# Patient Record
Sex: FEMALE | Race: WHITE | NOT HISPANIC OR LATINO | Employment: UNEMPLOYED | URBAN - METROPOLITAN AREA
[De-identification: names, ages, dates, MRNs, and addresses within clinical notes are randomized per-mention and may not be internally consistent; named-entity substitution may affect disease eponyms.]

---

## 2017-04-13 ENCOUNTER — LAB REQUISITION (OUTPATIENT)
Dept: LAB | Facility: HOSPITAL | Age: 62
End: 2017-04-13

## 2017-04-13 ENCOUNTER — APPOINTMENT (OUTPATIENT)
Dept: LAB | Facility: HOSPITAL | Age: 62
End: 2017-04-13

## 2017-04-13 DIAGNOSIS — H66.91 OTITIS MEDIA OF RIGHT EAR: ICD-10-CM

## 2017-04-13 PROCEDURE — 87205 SMEAR GRAM STAIN: CPT | Performed by: OTOLARYNGOLOGY

## 2017-04-13 PROCEDURE — 87186 SC STD MICRODIL/AGAR DIL: CPT | Performed by: OTOLARYNGOLOGY

## 2017-04-13 PROCEDURE — 87147 CULTURE TYPE IMMUNOLOGIC: CPT | Performed by: OTOLARYNGOLOGY

## 2017-04-13 PROCEDURE — 87070 CULTURE OTHR SPECIMN AEROBIC: CPT | Performed by: OTOLARYNGOLOGY

## 2017-04-13 PROCEDURE — 87077 CULTURE AEROBIC IDENTIFY: CPT | Performed by: OTOLARYNGOLOGY

## 2017-04-17 LAB
BACTERIA SPEC AEROBE CULT: ABNORMAL
BACTERIA SPEC AEROBE CULT: ABNORMAL
GRAM STN SPEC: ABNORMAL

## 2017-08-02 PROCEDURE — 87205 SMEAR GRAM STAIN: CPT | Performed by: OTOLARYNGOLOGY

## 2017-08-02 PROCEDURE — 87070 CULTURE OTHR SPECIMN AEROBIC: CPT | Performed by: OTOLARYNGOLOGY

## 2017-08-02 PROCEDURE — 87077 CULTURE AEROBIC IDENTIFY: CPT | Performed by: OTOLARYNGOLOGY

## 2017-08-02 PROCEDURE — 87186 SC STD MICRODIL/AGAR DIL: CPT | Performed by: OTOLARYNGOLOGY

## 2017-08-02 PROCEDURE — 87147 CULTURE TYPE IMMUNOLOGIC: CPT | Performed by: OTOLARYNGOLOGY

## 2017-08-03 ENCOUNTER — LAB REQUISITION (OUTPATIENT)
Dept: LAB | Facility: HOSPITAL | Age: 62
End: 2017-08-03

## 2017-08-03 DIAGNOSIS — H66.91 OTITIS MEDIA OF RIGHT EAR: ICD-10-CM

## 2017-08-03 DIAGNOSIS — H95.121 GRANULATION OF POSTMASTOIDECTOMY CAVITY OF RIGHT SIDE: ICD-10-CM

## 2017-08-06 LAB
BACTERIA SPEC AEROBE CULT: ABNORMAL
BACTERIA SPEC AEROBE CULT: ABNORMAL
GRAM STN SPEC: ABNORMAL
GRAM STN SPEC: ABNORMAL

## 2017-10-18 PROCEDURE — 87147 CULTURE TYPE IMMUNOLOGIC: CPT | Performed by: OTOLARYNGOLOGY

## 2017-10-18 PROCEDURE — 87070 CULTURE OTHR SPECIMN AEROBIC: CPT | Performed by: OTOLARYNGOLOGY

## 2017-10-18 PROCEDURE — 87205 SMEAR GRAM STAIN: CPT | Performed by: OTOLARYNGOLOGY

## 2017-10-18 PROCEDURE — 87077 CULTURE AEROBIC IDENTIFY: CPT | Performed by: OTOLARYNGOLOGY

## 2017-10-18 PROCEDURE — 87186 SC STD MICRODIL/AGAR DIL: CPT | Performed by: OTOLARYNGOLOGY

## 2017-10-19 ENCOUNTER — LAB REQUISITION (OUTPATIENT)
Dept: LAB | Facility: HOSPITAL | Age: 62
End: 2017-10-19

## 2017-10-19 DIAGNOSIS — H66.91 OTITIS MEDIA OF RIGHT EAR: ICD-10-CM

## 2017-10-22 LAB
BACTERIA SPEC AEROBE CULT: ABNORMAL
GRAM STN SPEC: ABNORMAL

## 2017-12-07 ENCOUNTER — LAB (OUTPATIENT)
Dept: LAB | Facility: HOSPITAL | Age: 62
End: 2017-12-07

## 2017-12-07 ENCOUNTER — TRANSCRIBE ORDERS (OUTPATIENT)
Dept: LAB | Facility: HOSPITAL | Age: 62
End: 2017-12-07

## 2017-12-07 DIAGNOSIS — H71.91 CHOLESTEATOMA OF RIGHT EAR: ICD-10-CM

## 2017-12-07 DIAGNOSIS — H65.499 CHRONIC OTITIS MEDIA WITH EFFUSION, UNSPECIFIED LATERALITY: Primary | ICD-10-CM

## 2017-12-07 PROCEDURE — 87077 CULTURE AEROBIC IDENTIFY: CPT

## 2017-12-07 PROCEDURE — 87070 CULTURE OTHR SPECIMN AEROBIC: CPT

## 2017-12-07 PROCEDURE — 87186 SC STD MICRODIL/AGAR DIL: CPT

## 2017-12-07 PROCEDURE — 87147 CULTURE TYPE IMMUNOLOGIC: CPT

## 2017-12-11 LAB
BACTERIA SPEC AEROBE CULT: ABNORMAL

## 2019-10-09 ENCOUNTER — OFFICE VISIT (OUTPATIENT)
Dept: ENDOCRINOLOGY | Facility: CLINIC | Age: 64
End: 2019-10-09

## 2019-10-09 VITALS
DIASTOLIC BLOOD PRESSURE: 72 MMHG | HEART RATE: 78 BPM | WEIGHT: 188 LBS | SYSTOLIC BLOOD PRESSURE: 120 MMHG | OXYGEN SATURATION: 98 %

## 2019-10-09 DIAGNOSIS — E61.1 IRON DEFICIENCY: ICD-10-CM

## 2019-10-09 DIAGNOSIS — R61 EXCESSIVE SWEATING: ICD-10-CM

## 2019-10-09 DIAGNOSIS — E11.42 CONTROLLED TYPE 2 DIABETES MELLITUS WITH DIABETIC POLYNEUROPATHY, WITHOUT LONG-TERM CURRENT USE OF INSULIN (HCC): Primary | ICD-10-CM

## 2019-10-09 DIAGNOSIS — R53.82 CHRONIC FATIGUE: ICD-10-CM

## 2019-10-09 DIAGNOSIS — E78.2 MIXED HYPERLIPIDEMIA: ICD-10-CM

## 2019-10-09 LAB
ALBUMIN UR-MCNC: 3.2 MG/DL
CORTIS SERPL-MCNC: 12.71 MCG/DL
CREAT UR-MCNC: 85.2 MG/DL
FERRITIN SERPL-MCNC: 65.9 NG/ML (ref 13–150)
GLUCOSE BLDC GLUCOMTR-MCNC: 156 MG/DL (ref 70–130)
HBA1C MFR BLD: 6.6 %
IRON 24H UR-MRATE: 124 MCG/DL (ref 37–145)
MICROALBUMIN/CREAT UR: 37.6 MG/G
T3FREE SERPL-MCNC: 2.82 PG/ML (ref 2–4.4)
T4 FREE SERPL-MCNC: 1.63 NG/DL (ref 0.93–1.7)
TSH SERPL DL<=0.05 MIU/L-ACNC: 2.58 UIU/ML (ref 0.27–4.2)
VIT B12 BLD-MCNC: 195 PG/ML (ref 211–946)

## 2019-10-09 PROCEDURE — 82607 VITAMIN B-12: CPT | Performed by: PHYSICIAN ASSISTANT

## 2019-10-09 PROCEDURE — 82043 UR ALBUMIN QUANTITATIVE: CPT | Performed by: PHYSICIAN ASSISTANT

## 2019-10-09 PROCEDURE — 86376 MICROSOMAL ANTIBODY EACH: CPT | Performed by: PHYSICIAN ASSISTANT

## 2019-10-09 PROCEDURE — 82728 ASSAY OF FERRITIN: CPT | Performed by: PHYSICIAN ASSISTANT

## 2019-10-09 PROCEDURE — 84481 FREE ASSAY (FT-3): CPT | Performed by: PHYSICIAN ASSISTANT

## 2019-10-09 PROCEDURE — 99204 OFFICE O/P NEW MOD 45 MIN: CPT | Performed by: PHYSICIAN ASSISTANT

## 2019-10-09 PROCEDURE — 82570 ASSAY OF URINE CREATININE: CPT | Performed by: PHYSICIAN ASSISTANT

## 2019-10-09 PROCEDURE — 83540 ASSAY OF IRON: CPT | Performed by: PHYSICIAN ASSISTANT

## 2019-10-09 PROCEDURE — 83036 HEMOGLOBIN GLYCOSYLATED A1C: CPT | Performed by: PHYSICIAN ASSISTANT

## 2019-10-09 PROCEDURE — 84439 ASSAY OF FREE THYROXINE: CPT | Performed by: PHYSICIAN ASSISTANT

## 2019-10-09 PROCEDURE — 84443 ASSAY THYROID STIM HORMONE: CPT | Performed by: PHYSICIAN ASSISTANT

## 2019-10-09 PROCEDURE — 82024 ASSAY OF ACTH: CPT | Performed by: PHYSICIAN ASSISTANT

## 2019-10-09 PROCEDURE — 82947 ASSAY GLUCOSE BLOOD QUANT: CPT | Performed by: PHYSICIAN ASSISTANT

## 2019-10-09 PROCEDURE — 86800 THYROGLOBULIN ANTIBODY: CPT | Performed by: PHYSICIAN ASSISTANT

## 2019-10-09 PROCEDURE — 82533 TOTAL CORTISOL: CPT | Performed by: PHYSICIAN ASSISTANT

## 2019-10-09 RX ORDER — HYDROCHLOROTHIAZIDE 12.5 MG/1
12.5 TABLET ORAL DAILY
COMMUNITY

## 2019-10-09 RX ORDER — LISINOPRIL 20 MG/1
20 TABLET ORAL DAILY
COMMUNITY

## 2019-10-09 RX ORDER — SIMVASTATIN 20 MG
20 TABLET ORAL NIGHTLY
COMMUNITY
End: 2019-10-09 | Stop reason: SDUPTHER

## 2019-10-09 RX ORDER — SIMVASTATIN 40 MG
40 TABLET ORAL NIGHTLY
Qty: 30 TABLET | Refills: 6 | Status: SHIPPED | OUTPATIENT
Start: 2019-10-09

## 2019-10-09 RX ORDER — METOPROLOL TARTRATE 100 MG/1
100 TABLET ORAL 2 TIMES DAILY
COMMUNITY

## 2019-10-09 RX ORDER — ASPIRIN 81 MG/1
81 TABLET ORAL DAILY
COMMUNITY

## 2019-10-09 RX ORDER — FENOFIBRATE 145 MG/1
145 TABLET, COATED ORAL DAILY
COMMUNITY

## 2019-10-09 NOTE — PROGRESS NOTES
Chief Complaint  Establish care for Diabetes Mellitus.    HPI   Celia Palomino is a 64 y.o. female who is here today for evaluation of Diabetes Mellitus type 2 and excessive sweating. The initial diagnosis of diabetes was made at age 48.     C/o excessive sweating all the time for the past 2 1/2 years with minimal activity to the point of being drenched. Happens especially if she is doing something or near the stove. Gets better after she rests for about 30 minutes. Denies hypoglycemia.   Also fatigue for the past 1 1/2 year.   Sleeps well. No sweating during the night.  BP is good.  HR normal.     A1C- 6.6 (10/9/419)  Labs reviewed:  9/19/19- GFR 44, potassium 4.4 wnl, TSH 2.4, , , H/H wnl    Diabetic complications: peripheral neuropathy- tingling and burning, previously on gabapentin   Eye exam current (within one year): due  Foot care and dental care: discussed    Current diabetic medications include:  Metformin 1000mg BID    Statin: zocor 20, fenofibrate 160mg    Past medications: none    Diabetic Monitoring  -   No meter or log for review      The following portions of the patient's history were reviewed and updated by me as appropriate: allergies, current medications, past family history, past social history, past surgical history and problem list.    History reviewed. No pertinent past medical history.    Medications    Current Outpatient Medications:   •  aspirin 81 MG EC tablet, Take 81 mg by mouth Daily., Disp: , Rfl:   •  fenofibrate 160 MG tablet, Take 160 mg by mouth Daily., Disp: , Rfl:   •  hydroCHLOROthiazide (HYDRODIURIL) 12.5 MG tablet, Take 12.5 mg by mouth Daily., Disp: , Rfl:   •  lisinopril (PRINIVIL,ZESTRIL) 20 MG tablet, Take 20 mg by mouth Daily., Disp: , Rfl:   •  metFORMIN (GLUCOPHAGE) 1000 MG tablet, Take 1,000 mg by mouth 2 (Two) Times a Day With Meals., Disp: , Rfl:   •  metoprolol tartrate (LOPRESSOR) 100 MG tablet, Take 100 mg by mouth 2 (Two) Times a Day., Disp: , Rfl:   •   simvastatin (ZOCOR) 40 MG tablet, Take 1 tablet by mouth Every Night., Disp: 30 tablet, Rfl: 6    Review of Systems  Review of Systems   Constitutional: Positive for fatigue. Negative for chills, fever and unexpected weight change (wt gain).   HENT: Negative for ear pain, hearing loss, nosebleeds, rhinorrhea and sore throat.    Eyes: Negative for pain, discharge, redness, itching and visual disturbance.   Respiratory: Negative for cough, shortness of breath and wheezing.    Cardiovascular: Negative for chest pain, palpitations and leg swelling.   Gastrointestinal: Negative for abdominal pain, blood in stool, constipation and diarrhea.   Endocrine: Positive for heat intolerance. Negative for cold intolerance and polydipsia.   Genitourinary: Negative for dysuria, frequency, hematuria, pelvic pain, vaginal bleeding and vaginal discharge.   Musculoskeletal: Negative for arthralgias, gait problem, joint swelling and myalgias.   Skin: Negative for rash.        Excessive sweating   Allergic/Immunologic: Negative.    Neurological: Negative for dizziness, syncope, weakness, numbness and headaches.   Hematological: Negative for adenopathy. Does not bruise/bleed easily.   Psychiatric/Behavioral: Negative for sleep disturbance and suicidal ideas. The patient is not nervous/anxious.         Physical Exam    /72   Pulse 78   Wt 85.3 kg (188 lb)   SpO2 98% There is no height or weight on file to calculate BMI.  Physical Exam   Constitutional: She is oriented to person, place, and time. She appears well-developed. No distress.   HENT:   Head: Normocephalic.   Right Ear: External ear normal.   Left Ear: External ear normal.   Mouth/Throat: No oropharyngeal exudate.   Eyes: Conjunctivae and lids are normal. Right eye exhibits no discharge. Left eye exhibits no discharge. Right pupil is reactive. Left pupil is reactive.   Neck: No JVD present. No tracheal deviation present. No thyroid mass and no thyromegaly present.    Cardiovascular: Normal rate, regular rhythm, normal heart sounds and intact distal pulses.   No murmur heard.  Pulmonary/Chest: Effort normal and breath sounds normal. No respiratory distress. She has no wheezes.   Abdominal: Soft. Bowel sounds are normal. There is no tenderness.   Musculoskeletal: She exhibits no edema or tenderness.    Celia had a diabetic foot exam performed today.   During the foot exam she had a monofilament test performed.    Neurological Sensory Findings -  Altered sharp/dull right ankle/foot discrimination and altered sharp/dull left ankle/foot discrimination.  Vascular Status -  Her right foot exhibits normal foot vasculature  and no edema. Her left foot exhibits normal foot vasculature  and no edema.  Skin Integrity  -  Her right foot skin is intact.  She has no right foot onychomycosis, no right foot ulcer and non-callous right foot.Her left foot skin is intact. She has no left foot onychomycosis, no left foot ulcer and non-callous left foot..  Lymphadenopathy:     She has no cervical adenopathy.   Neurological: She is alert and oriented to person, place, and time.   Skin: Skin is warm, dry and intact. No rash noted. She is not diaphoretic. No erythema.   Psychiatric: She has a normal mood and affect. Her speech is normal and behavior is normal. Thought content normal.       Labs and Imaging   Lab Results   Component Value Date    HGBA1C 6.6 10/09/2019     Office Visit on 10/09/2019   Component Date Value Ref Range Status   • Glucose 10/09/2019 156* 70 - 130 mg/dL Final   • Hemoglobin A1C 10/09/2019 6.6  % Final     No images are attached to the encounter or orders placed in the encounter.  No Images in the past 120 days found..    Assessment / Plan   Celia was seen today for establish care.    Diagnoses and all orders for this visit:    Controlled type 2 diabetes mellitus with diabetic polyneuropathy, without long-term current use of insulin (CMS/[facility])  -     POC Glucose Fingerstick  -      POC Glycosylated Hemoglobin (Hb A1C)  -     Microalbumin / Creatinine Urine Ratio - Urine, Clean Catch    Chronic fatigue  -     TSH  -     T4, Free  -     Thyroid Antibodies  -     T3, Free  -     Iron  -     Ferritin  -     Vitamin B12    Iron deficiency  -     Iron  -     Ferritin    Mixed hyperlipidemia  -     simvastatin (ZOCOR) 40 MG tablet; Take 1 tablet by mouth Every Night.    Excessive sweating  -     ACTH  -     Cortisol    Other orders  -     Cancel: Comprehensive Metabolic Panel  -     Cancel: Lipid Panel  -     Cancel: CBC & Differential        Diabetes Mellitus 2 is under good control.  -A1c 6.6  -cont metformin 1000mg BID    1.  Diet: 3-4 carb servings per meal for females, 4-5 carb servings per meal for males  Spread carb intake throughout the day  Increase lean protein and vegetable intake  Avoid sugary drinks and processed carbs including crackers, cookies, cakes  2.  Exercise: Recommend at least 30 minutes of exercise daily, at least 5 days per week. Increase exercise gradually.   3.  Blood Glucose Goal: Blood glucose goal <150 fasting, <180 2 hr postprandial  4.  Microalbumin due  5.  Education performed during this visit: long term diabetic complications discussed. , annual eye examinations at Ophthalmology discussed, dental hygiene discussed  and foot care reviewed., home glucose monitoring emphasized, all medications, side effects and compliance discussed carefully and Hypoglycemia management and prevention reviewed. Reviewed ‘ABCs’ of diabetes management (respective goals in parentheses):  A1C (<7), blood pressure (<130/80), and cholesterol (LDL <100, if CVD <70).    Hyperlipidemia  -would like to get LDL at least <100  -increase zocor to 40mg qhs    Fatigue  -labs as above    Excessive sweating  -labs as above, although likely not an endocrine issue   -consider trial of clonidine, can help with hot flashes in some people    There are no Patient Instructions on file for this  visit.    Follow up: Return in about 3 months (around 1/9/2020).    Discussed the nature of the disease including, risks, complications, implications, management, safe and proper use of medications. Encouraged therapeutic lifestyle changes including low calorie diet with plenty of fruits and vegetables, daily exercise, medication compliance, and keeping scheduled follow up appointments with me and any other providers. Encouraged patient to have appointment for complete physical, fasting labs, appropriate screenings, and immunizations on an annual basis.    Philip Montes PA-C

## 2019-10-10 LAB
ACTH PLAS-MCNC: 6.3 PG/ML (ref 7.2–63.3)
THYROGLOB AB SERPL-ACNC: <1 IU/ML (ref 0–0.9)
THYROPEROXIDASE AB SERPL-ACNC: 12 IU/ML (ref 0–34)

## 2020-09-24 ENCOUNTER — TRANSCRIBE ORDERS (OUTPATIENT)
Dept: LAB | Facility: HOSPITAL | Age: 65
End: 2020-09-24

## 2020-09-24 ENCOUNTER — LAB (OUTPATIENT)
Dept: LAB | Facility: HOSPITAL | Age: 65
End: 2020-09-24

## 2020-09-24 DIAGNOSIS — H95.121 GRANULATION OF POSTMASTOIDECTOMY CAVITY OF RIGHT SIDE: Primary | ICD-10-CM

## 2020-09-24 PROCEDURE — 87070 CULTURE OTHR SPECIMN AEROBIC: CPT | Performed by: OTOLARYNGOLOGY

## 2020-09-24 PROCEDURE — 87205 SMEAR GRAM STAIN: CPT | Performed by: OTOLARYNGOLOGY

## 2020-09-26 LAB
BACTERIA SPEC AEROBE CULT: NORMAL
GRAM STN SPEC: NORMAL

## 2022-05-24 ENCOUNTER — TELEPHONE (OUTPATIENT)
Dept: CARDIAC SURGERY | Facility: CLINIC | Age: 67
End: 2022-05-24

## 2022-05-24 NOTE — TELEPHONE ENCOUNTER
Left voicemail with pts contacts to confirm appt for 7/11/22 @ 095am with Dr. Jean. Reminder has been mailed.     Please let pt know of this info

## 2022-06-03 DIAGNOSIS — Z00.6 EXAMINATION FOR NORMAL COMPARISON FOR CLINICAL RESEARCH: Primary | ICD-10-CM

## 2022-07-11 ENCOUNTER — OFFICE VISIT (OUTPATIENT)
Dept: CARDIAC SURGERY | Facility: CLINIC | Age: 67
End: 2022-07-11

## 2022-07-11 VITALS
WEIGHT: 182.8 LBS | HEIGHT: 68 IN | BODY MASS INDEX: 27.71 KG/M2 | DIASTOLIC BLOOD PRESSURE: 74 MMHG | SYSTOLIC BLOOD PRESSURE: 118 MMHG | HEART RATE: 65 BPM | OXYGEN SATURATION: 98 % | TEMPERATURE: 97.1 F

## 2022-07-11 DIAGNOSIS — I71.20 THORACIC AORTIC ANEURYSM WITHOUT RUPTURE: Primary | ICD-10-CM

## 2022-07-11 PROCEDURE — 99203 OFFICE O/P NEW LOW 30 MIN: CPT | Performed by: THORACIC SURGERY (CARDIOTHORACIC VASCULAR SURGERY)

## 2022-07-11 RX ORDER — FERROUS SULFATE 325(65) MG
TABLET ORAL
COMMUNITY

## 2022-07-11 RX ORDER — PANTOPRAZOLE SODIUM 40 MG/1
TABLET, DELAYED RELEASE ORAL
COMMUNITY
Start: 2022-06-17

## 2022-07-11 NOTE — PROGRESS NOTES
07/11/2022  Patient Information  Celia Palomino                                                                                          [address] [facility] [address]   1955  'PCP/Referring Physician'  Marck Parker MD  [phone]  Marck Parker*  [phone]  Chief Complaint   Patient presents with   • Aortic Aneurysm     Referred by Dr. Sampson Stewart for ascending aortic aneurysm        History of Present Illness:  The patient is a 66-year-old female who was referred to me for an evaluation of a 4.3 cm ascending aortic aneurysm.  She is a current long-term smoker and has a history of diabetes. A recent CT scan of the chest demonstrated the ascending aorta to be 4.3 cm.  She says she does follow-up with a cardiologist in [place], [place], by Dr. Scott.   She says he has performed echocardiograms of her heart but she does not know the results. She thinks the last one was over 1 year ago.  She has no anginal symptoms and no family history of aneurysm.      Patient Active Problem List   Diagnosis   • Thoracic aortic aneurysm without rupture (HCC)     Past Medical History:   Diagnosis Date   • Arthritis    • Diabetes mellitus (HCC)    • GERD (gastroesophageal reflux disease)    • Hyperlipidemia    • Hypertension      Past Surgical History:   Procedure Laterality Date   • APPENDECTOMY     • CHOLECYSTECTOMY     • HYSTERECTOMY     • MASTOID SURGERY     • OVARIAN CYST REMOVAL     • TUBAL ABDOMINAL LIGATION         Current Outpatient Medications:   •  aspirin 81 MG EC tablet, Take 81 mg by mouth Daily., Disp: , Rfl:   •  fenofibrate (TRICOR) 145 MG tablet, Take 145 mg by mouth Daily., Disp: , Rfl:   •  ferrous sulfate 325 (65 FE) MG tablet, FeroSul 325 mg (65 mg iron) tablet, Disp: , Rfl:   •  lisinopril (PRINIVIL,ZESTRIL) 20 MG tablet, Take 20 mg by mouth Daily., Disp: , Rfl:   •  metFORMIN (GLUCOPHAGE) 1000 MG tablet, Take 1,000 mg by mouth 2 (Two) Times a Day With Meals., Disp: ,  Rfl:   •  metoprolol tartrate (LOPRESSOR) 100 MG tablet, Take 100 mg by mouth 2 (Two) Times a Day., Disp: , Rfl:   •  pantoprazole (PROTONIX) 40 MG EC tablet, , Disp: , Rfl:   •  simvastatin (ZOCOR) 40 MG tablet, Take 1 tablet by mouth Every Night., Disp: 30 tablet, Rfl: 6  •  hydroCHLOROthiazide (HYDRODIURIL) 12.5 MG tablet, Take 12.5 mg by mouth Daily. (Patient not taking: Reported on 7/11/2022), Disp: , Rfl:   Allergies   Allergen Reactions   • Reglan [Metoclopramide] Swelling     Social History     Socioeconomic History   • Marital status:    • Number of children: 3   Tobacco Use   • Smoking status: Current Every Day Smoker     Packs/day: 1.00     Years: 40.00     Pack years: 40.00     Types: Cigarettes   • Smokeless tobacco: Never Used   Vaping Use   • Vaping Use: Never used   Substance and Sexual Activity   • Alcohol use: Never   • Drug use: Never   • Sexual activity: Defer     Family History   Problem Relation Age of Onset   • Heart attack Mother    • Heart failure Mother    • Stroke Mother    • Throat cancer Sister    • Coronary artery disease Sister    • Cancer Sister    • Cancer Brother      Review of Systems   Constitutional: Positive for malaise/fatigue. Negative for chills, fever, night sweats and weight loss.   HENT: Positive for congestion and hearing loss. Negative for odynophagia and sore throat.    Cardiovascular: Positive for dyspnea on exertion. Negative for chest pain, leg swelling, orthopnea and palpitations.   Respiratory: Positive for cough and wheezing. Negative for hemoptysis.    Endocrine: Negative for cold intolerance, heat intolerance, polydipsia, polyphagia and polyuria.   Hematologic/Lymphatic: Does not bruise/bleed easily.   Skin: Negative for itching and rash.   Musculoskeletal: Positive for back pain and joint pain. Negative for joint swelling and myalgias.   Gastrointestinal: Positive for dysphagia. Negative for abdominal pain, constipation, diarrhea, hematemesis,  "hematochezia, melena, nausea and vomiting.   Genitourinary: Negative for dysuria, frequency and hematuria.   Neurological: Positive for dizziness and light-headedness. Negative for focal weakness, headaches, numbness and seizures.   Psychiatric/Behavioral: Negative for suicidal ideas.   All other systems reviewed and are negative.    Vitals:    07/11/22 0730   BP: 118/74   BP Location: Right arm   Patient Position: Sitting   Pulse: 65   Temp: 97.1 °F (36.2 °C)   SpO2: 98%   Weight: 82.9 kg (182 lb 12.8 oz)   Height: 172.7 cm (68\")      Physical Exam    CONSTITUTIONAL: Alert and conversant, Well dressed, Well nourished, No acute distress  EYES: Sclera clean, Anicteric, Pupils equal  ENT: No nasal deviation, Trachea midline  NECK: No neck masses, Supple  LUNGS: No wheezing, Cough, non-congested  HEART: No rubs, No murmurs  GASTROINTESTINAL: Soft, non-distended, No masses, Non tender  to palpation, normal bowel sounds  NEURO: No motor deficits, No sensory deficits, Cranial Nerves 2 through 12 grossly intact  PSYCHIATRIC: Oriented to person, place and time, No memory deficits, Mood appropriate  VASCULAR: No carotid bruits, Femoral pulses palpable and symmetric  MUSKULOSKELETAL: No extremity trauma or extremity asymmetry    The ROS, past medical history, surgical history, family history, social history and vitals were reviewed by myself and corrected as needed.      Labs/Imaging:  I reviewed the CT scan report and images of the ascending aorta.    Assessment/Plan:   The patient is a 66-year-old female who presents with a 4.3 cm ascending aortic aneurysm.  I discussed with her that this small size does not require repair at this time but will require monitoring.  There is no distinct cardiac murmur and the patient said she had an echocardiogram over a year ago and follows up routinely with her cardiologist in [place], [place].  I will try to obtain the report of that echocardiogram to assess her aortic valve to see if " there is any subtle abnormalities.  Also, at this time, I strongly recommended that she discontinue smoking as that has been associated with a more rapid rate of aneurysm growth.  I have also made arrangements for a repeat CT scan to be done in 1 year and she is agreeable to that plan.  We will see her back in 1 year with a scan and I have also recommended that she see her cardiologist again because she has not seen him in over 1 year and she says she will do that promptly.    Patient Active Problem List   Diagnosis   • Thoracic aortic aneurysm without rupture (HCC)     CC: MD Patricia Brown editing for Bayron Jean MD    I, Bayron Jean MD, have read and agree with the editing done by Patricia Hanson, .

## 2023-05-24 ENCOUNTER — TELEPHONE (OUTPATIENT)
Dept: CARDIAC SURGERY | Facility: CLINIC | Age: 68
End: 2023-05-24
Payer: MEDICARE

## 2023-05-24 NOTE — TELEPHONE ENCOUNTER
Caller: Celia Palomino    Relationship: Self    Best call back number: [phone]    What orders are you requesting (i.e. lab or imaging): CTA CHEST    In what timeframe would the patient need to come in: ASAP    Where will you receive your lab/imaging services: [facility]    Additional notes: PT HAS UPCOMING July APPT THAT NEEDS CTA CHEST PRIOR TO VISIT

## 2023-05-24 NOTE — TELEPHONE ENCOUNTER
Tried calling pt back-no Answer. CTA Will be scheduled some time in June-schedulers will start working on orders for July first part of June.

## 2023-05-30 DIAGNOSIS — I71.20 THORACIC AORTIC ANEURYSM WITHOUT RUPTURE, UNSPECIFIED PART: Primary | ICD-10-CM

## 2023-07-14 PROBLEM — E78.5 HYPERLIPIDEMIA: Status: ACTIVE | Noted: 2023-07-14

## 2023-07-14 PROBLEM — I10 HYPERTENSION: Status: ACTIVE | Noted: 2023-07-14

## 2023-07-14 PROBLEM — E11.9 DIABETES MELLITUS: Status: ACTIVE | Noted: 2023-07-14

## 2023-07-19 DIAGNOSIS — I71.21 ASCENDING AORTIC ANEURYSM, UNSPECIFIED WHETHER RUPTURED: Primary | ICD-10-CM

## 2023-07-26 DIAGNOSIS — R06.09 OTHER FORM OF DYSPNEA: Primary | ICD-10-CM

## 2023-08-17 DIAGNOSIS — R06.09 OTHER FORM OF DYSPNEA: ICD-10-CM

## 2023-08-18 ENCOUNTER — TELEPHONE (OUTPATIENT)
Dept: CARDIAC SURGERY | Facility: CLINIC | Age: 68
End: 2023-08-18
Payer: MEDICARE

## 2023-08-18 NOTE — TELEPHONE ENCOUNTER
Telephone follow-up regarding echocardiogram at [facility]: Attempted to call patient with results of echocardiogram to say that no aortic valvular disease was identified.  Unable to leave a message or speak with patient.  She will follow-up as scheduled 7/10/2024 for ascending aortic aneurysm surveillance.    Rox PAEZ

## 2024-04-26 ENCOUNTER — TELEPHONE (OUTPATIENT)
Dept: OBSTETRICS AND GYNECOLOGY | Facility: CLINIC | Age: 69
End: 2024-04-26

## 2024-04-26 NOTE — TELEPHONE ENCOUNTER
PROVIDER: DR. TELLEZ    CALLER: RAMIREZ MCMAHAN    PH#[phone]    SAME DAY CANCEL FOR 3PM - DEATH IN FAMILY - IN PROCESS OF R/S

## 2024-05-29 DIAGNOSIS — I71.21 ANEURYSM OF ASCENDING AORTA WITHOUT RUPTURE: Primary | ICD-10-CM

## 2024-06-19 DIAGNOSIS — I71.21 ANEURYSM OF ASCENDING AORTA WITHOUT RUPTURE: ICD-10-CM

## 2024-07-10 ENCOUNTER — OFFICE VISIT (OUTPATIENT)
Dept: CARDIAC SURGERY | Facility: CLINIC | Age: 69
End: 2024-07-10
Payer: MEDICARE

## 2024-07-10 VITALS
TEMPERATURE: 97.5 F | DIASTOLIC BLOOD PRESSURE: 60 MMHG | HEART RATE: 61 BPM | HEIGHT: 68 IN | WEIGHT: 187.4 LBS | BODY MASS INDEX: 28.4 KG/M2 | OXYGEN SATURATION: 99 % | SYSTOLIC BLOOD PRESSURE: 120 MMHG

## 2024-07-10 DIAGNOSIS — I77.819 AORTIC DILATATION: Primary | ICD-10-CM

## 2024-07-10 DIAGNOSIS — I71.21 ANEURYSM OF ASCENDING AORTA WITHOUT RUPTURE: Primary | ICD-10-CM

## 2024-07-10 RX ORDER — CYANOCOBALAMIN 1000 UG/ML
INJECTION, SOLUTION INTRAMUSCULAR; SUBCUTANEOUS
COMMUNITY
Start: 2024-06-18

## 2024-07-10 RX ORDER — DILTIAZEM HYDROCHLORIDE 60 MG/1
TABLET, FILM COATED ORAL
COMMUNITY
Start: 2024-07-03

## 2024-07-10 RX ORDER — BUPRENORPHINE HYDROCHLORIDE AND NALOXONE HYDROCHLORIDE DIHYDRATE 8; 2 MG/1; MG/1
TABLET SUBLINGUAL
COMMUNITY

## 2024-07-10 RX ORDER — DICYCLOMINE HCL 20 MG
TABLET ORAL
COMMUNITY

## 2024-07-10 NOTE — PROGRESS NOTES
[facility] Cardiothoracic Surgery Office Follow Up Note     Date of Encounter: 07/10/2024     Name: Celia Palomino  : 1955     Referred By: No ref. provider found  PCP: Marck Parker MD    Chief Complaint:    Chief Complaint   Patient presents with    Aortic Aneurysm     1 year follow-up with CTA chest done at [facility] on 24       Subjective      History of Present Illness:    Celia Palomino is a 68 y.o. female current smoker with history of HTN, HLD on statin therapy, non-insulin-dependent DM, and ascending aortic dilatation being followed by Dr. Jean since .  She does have familial history of aneurysmal disease (mother  related to brain aneurysm).  She presents today for annual surveillance asymptomatic with no chest, upper back, or scapular pain. She was having difficulty with blood pressure control but has been working on that with her cardiologist Dr. Cronin in [facility].    Review of Systems:  Review of Systems   Constitutional: Negative for chills, decreased appetite, diaphoresis, fever, malaise/fatigue, night sweats, weight gain and weight loss.   HENT:  Negative for hoarse voice.    Eyes:  Negative for blurred vision, double vision and visual disturbance.   Cardiovascular:  Positive for dyspnea on exertion and leg swelling. Negative for chest pain, claudication, irregular heartbeat, near-syncope, orthopnea, palpitations, paroxysmal nocturnal dyspnea and syncope.   Respiratory:  Negative for cough, hemoptysis, shortness of breath, sputum production and wheezing.    Hematologic/Lymphatic: Negative for adenopathy and bleeding problem. Does not bruise/bleed easily.   Skin:  Negative for color change, nail changes, poor wound healing and rash.   Musculoskeletal:  Positive for back pain, falls (3 falls in the last year. 1 was couple weeks ago- due to loss of balance) and joint pain. Negative for muscle cramps.   Gastrointestinal:  Positive for  dysphagia. Negative for abdominal pain and heartburn.   Genitourinary:  Negative for flank pain.   Neurological:  Positive for dizziness, light-headedness, loss of balance and numbness. Negative for brief paralysis, disturbances in coordination, focal weakness, headaches, paresthesias, sensory change, vertigo and weakness.   Psychiatric/Behavioral:  Negative for depression and suicidal ideas. The patient is not nervous/anxious.    Allergic/Immunologic: Negative for persistent infections.       I have reviewed the following portions of the patient's history: problem list, current medications, allergies, past surgical history, past medical history, past social history, past family history, and ROS and confirm it's accurate.    Allergies:  Allergies   Allergen Reactions    Reglan [Metoclopramide] Swelling       Medications:      Current Outpatient Medications:     aspirin 81 MG EC tablet, Take 1 tablet by mouth Daily., Disp: , Rfl:     buprenorphine-naloxone (SUBOXONE) 8-2 MG per SL tablet, , Disp: , Rfl:     cyanocobalamin 1000 MCG/ML injection, , Disp: , Rfl:     dicyclomine (BENTYL) 20 MG tablet, , Disp: , Rfl:     dilTIAZem (CARDIZEM) 60 MG tablet, , Disp: , Rfl:     fenofibrate (TRICOR) 145 MG tablet, Take 1 tablet by mouth Daily., Disp: , Rfl:     ferrous sulfate 325 (65 FE) MG tablet, FeroSul 325 mg (65 mg iron) tablet, Disp: , Rfl:     linaclotide (Linzess) 145 MCG capsule capsule, , Disp: , Rfl:     lisinopril (PRINIVIL,ZESTRIL) 20 MG tablet, Take 1 tablet by mouth Daily., Disp: , Rfl:     metFORMIN (GLUCOPHAGE) 1000 MG tablet, Take 1 tablet by mouth 2 (Two) Times a Day With Meals., Disp: , Rfl:     metoprolol tartrate (LOPRESSOR) 100 MG tablet, Take 1 tablet by mouth 2 (Two) Times a Day., Disp: , Rfl:     pantoprazole (PROTONIX) 40 MG EC tablet, , Disp: , Rfl:     simvastatin (ZOCOR) 40 MG tablet, Take 1 tablet by mouth Every Night., Disp: 30 tablet, Rfl: 6    History:   Past Medical History:   Diagnosis Date  "   Arthritis     Diabetes mellitus     GERD (gastroesophageal reflux disease)     Hyperlipidemia     Hypertension     Renal disorder        Past Surgical History:   Procedure Laterality Date    APPENDECTOMY      CHOLECYSTECTOMY      HYSTERECTOMY      MASTOID SURGERY      OVARIAN CYST REMOVAL      TUBAL ABDOMINAL LIGATION         Social History     Socioeconomic History    Marital status:     Number of children: 3   Tobacco Use    Smoking status: Every Day     Current packs/day: 1.00     Average packs/day: 1 pack/day for 40.0 years (40.0 ttl pk-yrs)     Types: Cigarettes    Smokeless tobacco: Never   Vaping Use    Vaping status: Never Used   Substance and Sexual Activity    Alcohol use: Never    Drug use: Never    Sexual activity: Defer        Family History   Problem Relation Age of Onset    Heart attack Mother     Heart failure Mother     Stroke Mother     Throat cancer Sister     Coronary artery disease Sister     Cancer Sister     Cancer Brother        Objective   Physical Exam:  Vitals:    07/10/24 1402 07/10/24 1405   BP: 122/64 120/60   BP Location: Right arm Left arm   Patient Position: Sitting Sitting   Pulse: 61    Temp: 97.5 °F (36.4 °C)    SpO2: 99%    Weight: 85 kg (187 lb 6.4 oz)    Height: 172.7 cm (68\")  Comment: patient reported       Body mass index is 28.49 kg/m².    Physical Exam  Vitals and nursing note reviewed.   Constitutional:       Appearance: Normal appearance.   Cardiovascular:      Rate and Rhythm: Normal rate.      Heart sounds: Normal heart sounds, S1 normal and S2 normal.      No systolic murmur is present.   Pulmonary:      Effort: Pulmonary effort is normal.   Skin:     General: Skin is warm and dry.   Neurological:      Mental Status: She is alert and oriented to person, place, and time. Mental status is at baseline.         Imaging/Labs:  CT Angiogram Chest (06/19/2024)   1.  No dissection.  No visualized embolism is characterized the most proximal segmental level.  " Consider alternative form of imaging if indicated.  2.  Anterior posterior diameter of the ascending aorta at the level of the right main pulmonary artery is approximately 3.9 cm in transverse diameter 3.9 cm.  Slightly inferior maximal transverse diameter 4 cm.  3.  Lungs are well aerated without a focal area of consolidation.  Mild apical fibrosis.  4.  Cyst within the spleen of approximately 2 cm.  Hounsfield unit of approximately 7    Adult Transthoracic Echo Complete W/ Cont if Necessary Per Protocol (2023)       CT Angiogram Chest (2023 11:21)   Unchanged borderline aneurysmal ectasia of the ascending thoracic aorta, 3.9 cm in greatest transverse dimension.   Electronically Signed: [place]     EXTERNAL MEDICAL RECORDS - SCAN - office notes from Dr. Sampson Stewart ER provider from [facility]- referral (2022)   Vasculature: Ascending aortic aneurysm at 43 x 40 mm.  Stable.  Impression: No acute findings.      Assessment / Plan      Assessment / Plan:  Diagnoses and all orders for this visit:    1. Aortic dilatation (Primary)       being followed by Dr. Jean since .    positive familial history of aneurysmal disease (mother  related to brain aneurysm).  Structurally normal aortic valve per  ECHO  No AAA screening - will obtain  annual surveillance asymptomatic with good blood pressure control after antihypertensive management  CT chest personally reviewed with stable 3.9-4.0cm fusiform ascending aortic dilatation  By definition, this does not meet size criteria for aneurysm and is non-interventional until 5-5.5cm or greater  This is unchanged from imaging as far back as   Will push to biennial surveillance   Encouraged smoking cessation     Follow Up:   Return in about 2 years (around 7/10/2026) for Imaging: CT Chest.   Or sooner for any further concerns or worsening sign and symptoms. If unable to reach us in the office please dial 911 or go to the nearest emergency  department.      JEANNINE Pulido  [facility] Cardiothoracic Surgery      Time Spent: I spent 27 minutes caring for Celia on this date of service. This time includes time spent by me in the following activities: preparing for the visit, reviewing tests, obtaining and/or reviewing a separately obtained history, performing a medically appropriate examination and/or evaluation, counseling and educating the patient/family/caregiver, ordering medications, tests, or procedures, referring and communicating with other health care professionals, documenting information in the medical record, independently interpreting results and communicating that information with the patient/family/caregiver, and care coordination.

## 2024-10-14 ENCOUNTER — TELEPHONE (OUTPATIENT)
Dept: CARDIAC SURGERY | Facility: CLINIC | Age: 69
End: 2024-10-14
Payer: MEDICARE

## 2024-10-14 NOTE — TELEPHONE ENCOUNTER
Caller: Celia Palomino    Relationship: Self    Best call back number: [phone]     What is the best time to reach you: ANY     Who are you requesting to speak with (clinical staff, provider,  specific staff member): ANY     What was the call regarding: PATIENT STATES THAT SHE HAD AN ULTRASOUND 2 WEEKS AGO AT [facility] AND WOULD LIKE TO KNOW IF THE US SCHEDULED FOR 10/15/24 IS NECESSARY.     Is it okay if the provider responds through MyChart: PATIENT WOULD LIKE A CALL BACK ASAP.

## 2024-11-01 NOTE — TELEPHONE ENCOUNTER
I spoke with patient- per [facility] abdominal aorta ultrasound ok will plan to see patient back with CT chest in 2 years.